# Patient Record
Sex: MALE | Race: WHITE | NOT HISPANIC OR LATINO | Employment: OTHER | URBAN - METROPOLITAN AREA
[De-identification: names, ages, dates, MRNs, and addresses within clinical notes are randomized per-mention and may not be internally consistent; named-entity substitution may affect disease eponyms.]

---

## 2019-12-10 ENCOUNTER — TRANSCRIBE ORDERS (OUTPATIENT)
Dept: ADMINISTRATIVE | Facility: HOSPITAL | Age: 49
End: 2019-12-10

## 2019-12-10 DIAGNOSIS — N20.0 KIDNEY STONE: Primary | ICD-10-CM

## 2019-12-24 ENCOUNTER — HOSPITAL ENCOUNTER (OUTPATIENT)
Dept: CT IMAGING | Facility: HOSPITAL | Age: 49
Discharge: HOME | End: 2019-12-24

## 2019-12-24 DIAGNOSIS — N20.0 KIDNEY STONE: ICD-10-CM

## 2020-01-07 ENCOUNTER — HOSPITAL ENCOUNTER (OUTPATIENT)
Dept: CT IMAGING | Facility: HOSPITAL | Age: 50
Discharge: HOME | End: 2020-01-07
Payer: COMMERCIAL

## 2020-01-07 DIAGNOSIS — N20.0 KIDNEY STONE: ICD-10-CM

## 2020-01-07 PROCEDURE — 74176 CT ABD & PELVIS W/O CONTRAST: CPT

## 2020-01-16 ENCOUNTER — CONSULT (OUTPATIENT)
Dept: UROLOGY | Facility: CLINIC | Age: 50
End: 2020-01-16
Payer: COMMERCIAL

## 2020-01-16 ENCOUNTER — PREP FOR PROCEDURE (OUTPATIENT)
Dept: UROLOGY | Facility: CLINIC | Age: 50
End: 2020-01-16

## 2020-01-16 VITALS
HEIGHT: 68 IN | HEART RATE: 95 BPM | SYSTOLIC BLOOD PRESSURE: 120 MMHG | WEIGHT: 154.2 LBS | DIASTOLIC BLOOD PRESSURE: 72 MMHG | BODY MASS INDEX: 23.37 KG/M2

## 2020-01-16 DIAGNOSIS — N20.0 KIDNEY STONES: Primary | ICD-10-CM

## 2020-01-16 DIAGNOSIS — N20.0 CALCULUS OF KIDNEY: Primary | ICD-10-CM

## 2020-01-16 DIAGNOSIS — Z01.818 PRE-OP TESTING: ICD-10-CM

## 2020-01-16 PROCEDURE — 99203 OFFICE O/P NEW LOW 30 MIN: CPT | Performed by: PHYSICIAN ASSISTANT

## 2020-01-16 PROCEDURE — 82360 CALCULUS ASSAY QUANT: CPT | Performed by: PHYSICIAN ASSISTANT

## 2020-01-16 RX ORDER — DULOXETIN HYDROCHLORIDE 20 MG/1
40 CAPSULE, DELAYED RELEASE ORAL 3 TIMES DAILY
COMMUNITY

## 2020-01-16 RX ORDER — UBIDECARENONE 75 MG
CAPSULE ORAL DAILY
COMMUNITY
End: 2020-01-22

## 2020-01-16 RX ORDER — TAMSULOSIN HYDROCHLORIDE 0.4 MG/1
CAPSULE ORAL
COMMUNITY

## 2020-01-16 RX ORDER — SERTRALINE HYDROCHLORIDE 100 MG/1
TABLET, FILM COATED ORAL
COMMUNITY

## 2020-01-16 RX ORDER — SODIUM CHLORIDE, SODIUM LACTATE, POTASSIUM CHLORIDE, CALCIUM CHLORIDE 600; 310; 30; 20 MG/100ML; MG/100ML; MG/100ML; MG/100ML
125 INJECTION, SOLUTION INTRAVENOUS CONTINUOUS
Status: CANCELLED | OUTPATIENT
Start: 2020-01-16

## 2020-01-16 RX ORDER — GABAPENTIN 250 MG/5ML
SOLUTION ORAL
COMMUNITY

## 2020-01-16 RX ORDER — BACLOFEN 10 MG/1
10 TABLET ORAL 3 TIMES DAILY PRN
COMMUNITY

## 2020-01-16 NOTE — PROGRESS NOTES
HISTORY AND PHYSICAL       Patient Identifiers: Chuy Shannon (MRN: [id number])    Urology, Mansoor Salcedo PA-C  Date of Service: 1/16/2020    History of Present Illness:     Chuy Shannon is a 54 y o  Male referred from the [place] for evaluation of kidney stones  He previously saw a urologist in [place]  He had a CT scan by his family doctor showing bilateral stones 7 mm in the left kidney and 11 mm in the right kidney  He brings in multiple stones which he has passed over relatively short period of time mostly from his left side  He is unable to give a urine sample  He has multiple sclerosis walks with a cane and is fairly shaky  Past Medical, Past Surgical History:     Past Medical History:   Diagnosis Date    Kidney stone    :    Past Surgical History:   Procedure Laterality Date    LITHOTRIPSY     :    Medications, Allergies:     Current Outpatient Medications:     baclofen 10 mg tablet, Take 10 mg by mouth Three times daily as needed, Disp: , Rfl:     Cholecalciferol 1 25 MG (04329 UT) capsule, 1,000 Units , Disp: , Rfl:     cyanocobalamin (VITAMIN B-12) 100 mcg tablet, Take by mouth daily, Disp: , Rfl:     Diclofenac Epolamine 1 3 % PTCH, APPLY 2GM TO UPPER EXTREMITIES TOPICALLY FOUR TIMES A DAY AS NEEDED **USE DOSING CARD** (DO NOT EXCEED 16 GRAMS DAILY TO ANY AFFECTED JOINT OF THE LOWER EXTREMITIES  DO NOT EXCEED 8 GRAMS DAILY TO ANY AFFECTED JOINT OF THE UPPER EXTREMITIES   DO NOT EXCEED A TOTAL DOSE OF 32 GRAMS DAILY OVER ALL JOINTS)  FOR NECK AND SHOULDER PAIN, Disp: , Rfl:     DULoxetine (CYMBALTA) 20 mg capsule, Take 40 mg by mouth, Disp: , Rfl:     Gabapentin 300 MG/6ML SOLN, TAKE TWO CAPSULES BY MOUTH EVERY MORNING AND TAKE ONE CAPSULE NOON AND TAKE TWO CAPSULES EVENING FOR NEUROPATHIC PAIN OR SEIZURES, Disp: , Rfl:     hydrocortisone 2 5 % ointment, INSERT THIN LAYER INTO RECTUM THREE TIMES A DAY AS NEEDED FOR HEMORRHOIDAL PAIN AND SWELLING, Disp: , Rfl:     sertraline (ZOLOFT) 100 mg tablet, TAKE ONE AND ONE-HALF TABLETS BY MOUTH EVERY DAY FOR MOOD, Disp: , Rfl:     tamsulosin (FLOMAX) 0 4 mg, TAKE ONE CAPSULE BY MOUTH DAILY FOR PROSTATE/NIGHTTIME URINATION (TAKE 30 MINUTES AFTER THE SAME MEAL DAILY), Disp: , Rfl:     Allergies: Allergies   Allergen Reactions    Aspirin      Other reaction(s): Swelling of structure of eye (finding)    Teriflunomide Abdominal Pain     Other reaction(s): Abdominal pain (finding)     :    Social and Family History:   Social History:   Social History     Tobacco Use    Smoking status: Current Every Day Smoker    Smokeless tobacco: Never Used   Substance Use Topics    Alcohol use: Not Currently    Drug use: Never     Social History     Tobacco Use   Smoking Status Current Every Day Smoker   Smokeless Tobacco Never Used       Family History:  History reviewed  No pertinent family history :     Review of Systems:     General: Fever, chills, or night sweats: negative  Cardiac: Negative for chest pain  Pulmonary: Negative for shortness of breath  Gastrointestinal: Abdominal pain negative  Nausea, vomiting, or diarrhea negative,  Genitourinary: See HPI above  Patient does not have hematuria  All other systems queried were negative  Physical Exam:   General: Patient is pleasant and in NAD  Awake and alert  /72   Pulse 95   Ht 5' 8" (1 727 m)   Wt 69 9 kg (154 lb 3 2 oz)   BMI 23 45 kg/m²   Cardiac: regular rate  Peripheral edema: negative  Pulmonary: Non-labored breathing lungs clear bilaterally  Abdomen: Soft, non-tender, non-distended  No surgical scars  No masses, tenderness, hernias noted  Genitourinary: Negative CVA tenderness, negative suprapubic tenderness        Labs:   No results found for: HGB, HCT, WBC, PLT]    No results found for: NA, K, CL, CO2, BUN, CREATININE, CALCIUM, GLUCOSE]    Imaging:   I personally reviewed the images and report of the following studies, and reviewed them with the patient:  CT ABDOMEN AND PELVIS WITHOUT IV CONTRAST - LOW DOSE RENAL STONE      IMPRESSION:     Bilateral nonobstructive nephrolithiasis  No hydronephrosis  Numerous bladder calculi      ASSESSMENT:       1  Nephrolithiasis   2   Multiple sclerosis    PLAN:   - I reviewed the options of management the patient  - he would like to proceed with ureteroscopy and laser lithotripsy as soon as possible to the left side which has been his most bothersome  - I did point out of the right sided stone burden is  Larger however he would like to proceed on the left side 1st  - case request was entered for Dr Fahad Coronado PA-C

## 2020-01-21 ENCOUNTER — TELEPHONE (OUTPATIENT)
Dept: UROLOGY | Facility: CLINIC | Age: 50
End: 2020-01-21

## 2020-01-21 PROBLEM — N20.0 KIDNEY STONES: Status: ACTIVE | Noted: 2020-01-21

## 2020-01-21 NOTE — TELEPHONE ENCOUNTER
I spoke with pt this afternoon and scheduled him for a procedure with Dr Evia Halsted on 2/11/20 at the [facility]  I verbally went over all of pt 's pre-op instruction and prep information with pt  He will have a urine culture done at a [facility] facility 1-2 weeks prior to this procedure  Per pt 's request I mailed him a copy of his surgical packet and instructed him to call me with any questions or concerns regarding this procedure

## 2020-01-22 NOTE — PRE-PROCEDURE INSTRUCTIONS
Pre-Surgery Instructions:   Medication Instructions    baclofen 10 mg tablet Instructed patient per Anesthesia Guidelines   Cholecalciferol 1 25 MG (09884 UT) capsule Instructed patient per Anesthesia Guidelines   Diclofenac Epolamine 1 3 % PTCH Instructed patient per Anesthesia Guidelines   DULoxetine (CYMBALTA) 20 mg capsule Instructed patient per Anesthesia Guidelines   Gabapentin 300 MG/6ML SOLN Instructed patient per Anesthesia Guidelines   hydrocortisone 2 5 % ointment Instructed patient per Anesthesia Guidelines   sertraline (ZOLOFT) 100 mg tablet Instructed patient per Anesthesia Guidelines   tamsulosin (FLOMAX) 0 4 mg Instructed patient per Anesthesia Guidelines      Pre-op medication, and showering instructions with antibacteral soap reviewed

## 2020-02-03 ENCOUNTER — ANESTHESIA EVENT (OUTPATIENT)
Dept: PERIOP | Facility: AMBULARY SURGERY CENTER | Age: 50
End: 2020-02-03
Payer: OTHER GOVERNMENT

## 2020-02-05 LAB
CA PHOS MFR STONE: 94 %
CALCIUM OXALATE DIHYDRATE MFR STONE IR: 2 %
COLOR STONE: NORMAL
COM MFR STONE: 4 %
COMMENT-STONE3: NORMAL
COMPOSITION: NORMAL
LABORATORY COMMENT REPORT: NORMAL
NIDUS STONE QL: NORMAL
PHOTO: NORMAL
SIZE STONE: NORMAL MM
STONE ANALYSIS-IMP: NORMAL
WT STONE: 110 MG

## 2020-02-06 ENCOUNTER — APPOINTMENT (OUTPATIENT)
Dept: LAB | Facility: CLINIC | Age: 50
End: 2020-02-06
Payer: COMMERCIAL

## 2020-02-06 ENCOUNTER — TRANSCRIBE ORDERS (OUTPATIENT)
Dept: LAB | Facility: CLINIC | Age: 50
End: 2020-02-06

## 2020-02-06 DIAGNOSIS — N20.0 KIDNEY STONES: ICD-10-CM

## 2020-02-06 DIAGNOSIS — Z01.818 PRE-OP TESTING: ICD-10-CM

## 2020-02-06 PROCEDURE — 87086 URINE CULTURE/COLONY COUNT: CPT

## 2020-02-07 LAB — BACTERIA UR CULT: NORMAL

## 2020-02-07 PROCEDURE — NC001 PR NO CHARGE: Performed by: UROLOGY

## 2020-02-07 NOTE — H&P
HISTORY AND PHYSICAL EXAM      Basil Montiel is a 54 y o  Male referred from the [place] for evaluation of kidney stones  He previously saw a urologist in [place]  He had a CT scan by his family doctor showing bilateral stones 7 mm in the left kidney and 11 mm in the right kidney  He brings in multiple stones which he has passed over relatively short period of time mostly from his left side     He has multiple sclerosis walks with a cane and is fairly shaky      Past Medical, Past Surgical History:      Medical History        Past Medical History:   Diagnosis Date    Kidney stone        :  Multiple sclerosis     Surgical History         Past Surgical History:   Procedure Laterality Date    LITHOTRIPSY          :     Medications, Allergies:      Current Outpatient Medications:     baclofen 10 mg tablet, Take 10 mg by mouth Three times daily as needed, Disp: , Rfl:     Cholecalciferol 1 25 MG (49261 UT) capsule, 1,000 Units , Disp: , Rfl:     cyanocobalamin (VITAMIN B-12) 100 mcg tablet, Take by mouth daily, Disp: , Rfl:     Diclofenac Epolamine 1 3 % PTCH, APPLY 2GM TO UPPER EXTREMITIES TOPICALLY FOUR TIMES A DAY AS NEEDED **USE DOSING CARD** (DO NOT EXCEED 16 GRAMS DAILY TO ANY AFFECTED JOINT OF THE LOWER EXTREMITIES  DO NOT EXCEED 8 GRAMS DAILY TO ANY AFFECTED JOINT OF THE UPPER EXTREMITIES   DO NOT EXCEED A TOTAL DOSE OF 32 GRAMS DAILY OVER ALL JOINTS)  FOR NECK AND SHOULDER PAIN, Disp: , Rfl:     DULoxetine (CYMBALTA) 20 mg capsule, Take 40 mg by mouth, Disp: , Rfl:     Gabapentin 300 MG/6ML SOLN, TAKE TWO CAPSULES BY MOUTH EVERY MORNING AND TAKE ONE CAPSULE NOON AND TAKE TWO CAPSULES EVENING FOR NEUROPATHIC PAIN OR SEIZURES, Disp: , Rfl:     hydrocortisone 2 5 % ointment, INSERT THIN LAYER INTO RECTUM THREE TIMES A DAY AS NEEDED FOR HEMORRHOIDAL PAIN AND SWELLING, Disp: , Rfl:     sertraline (ZOLOFT) 100 mg tablet, TAKE ONE AND ONE-HALF TABLETS BY MOUTH EVERY DAY FOR MOOD, Disp: , Rfl:     tamsulosin (FLOMAX) 0 4 mg, TAKE ONE CAPSULE BY MOUTH DAILY FOR PROSTATE/NIGHTTIME URINATION (TAKE 30 MINUTES AFTER THE SAME MEAL DAILY), Disp: , Rfl:      Allergies: Allergies   Allergen Reactions    Aspirin         Other reaction(s): Swelling of structure of eye (finding)    Teriflunomide Abdominal Pain       Other reaction(s): Abdominal pain (finding)      :     Social and Family History:   Social History:   Social History           Tobacco Use    Smoking status: Current Every Day Smoker    Smokeless tobacco: Never Used   Substance Use Topics    Alcohol use: Not Currently    Drug use: Never        Social History          Tobacco Use   Smoking Status Current Every Day Smoker   Smokeless Tobacco Never Used         Family History:  History reviewed  No pertinent family history  :      Review of Systems:      General: Fever, chills, or night sweats: negative  Cardiac: Negative for chest pain     Pulmonary: Negative for shortness of breath  Gastrointestinal: Abdominal pain negative  Nausea, vomiting, or diarrhea negative,  Genitourinary: See HPI above   Patient does not have hematuria  All other systems queried were negative      Physical Exam:   General: Patient is pleasant and in NAD  Awake and alert  /72   Pulse 95   Ht 5' 8" (1 727 m)   Wt 69 9 kg (154 lb 3 2 oz)   BMI 23 45 kg/m²   Cardiac: regular rate, no murmurs  Peripheral edema: negative  Pulmonary: Non-labored breathing lungs clear bilaterally  Abdomen: Soft, non-tender, non-distended   No surgical scars   No masses, tenderness, hernias noted     Genitourinary: Negative CVA tenderness, negative suprapubic tenderness    Neuro:  Intact no motor sensory deficits        Labs:   No results found for: HGB, HCT, WBC, PLT]     No results found for: NA, K, CL, CO2, BUN, CREATININE, CALCIUM, GLUCOSE]     Imaging:   I personally reviewed the images and report of the following studies, and reviewed them with the patient:  CT ABDOMEN AND PELVIS WITHOUT IV CONTRAST - LOW DOSE RENAL STONE      IMPRESSION:     Bilateral nonobstructive nephrolithiasis   No hydronephrosis      Numerous bladder calculi        ASSESSMENT:       1  Nephrolithiasis   2   Multiple sclerosis     PLAN:   - I reviewed the options of management the patient  - he would like to proceed with LEFT ureteroscopy and laser lithotripsy as soon as possible to the left side which has been his most bothersome

## 2020-02-11 ENCOUNTER — ANESTHESIA (OUTPATIENT)
Dept: PERIOP | Facility: AMBULARY SURGERY CENTER | Age: 50
End: 2020-02-11
Payer: OTHER GOVERNMENT

## 2020-02-11 ENCOUNTER — APPOINTMENT (OUTPATIENT)
Dept: RADIOLOGY | Facility: AMBULARY SURGERY CENTER | Age: 50
End: 2020-02-11
Payer: OTHER GOVERNMENT

## 2020-02-11 ENCOUNTER — HOSPITAL ENCOUNTER (OUTPATIENT)
Facility: AMBULARY SURGERY CENTER | Age: 50
Setting detail: OUTPATIENT SURGERY
Discharge: HOME | End: 2020-02-11
Attending: UROLOGY | Admitting: UROLOGY
Payer: OTHER GOVERNMENT

## 2020-02-11 VITALS
HEART RATE: 61 BPM | HEIGHT: 68 IN | DIASTOLIC BLOOD PRESSURE: 84 MMHG | RESPIRATION RATE: 18 BRPM | TEMPERATURE: 97.6 F | WEIGHT: 154 LBS | OXYGEN SATURATION: 99 % | SYSTOLIC BLOOD PRESSURE: 133 MMHG | BODY MASS INDEX: 23.34 KG/M2

## 2020-02-11 DIAGNOSIS — N39.0 URINARY TRACT INFECTION WITHOUT HEMATURIA, SITE UNSPECIFIED: Primary | ICD-10-CM

## 2020-02-11 DIAGNOSIS — N39.0 URINARY TRACT INFECTION WITHOUT HEMATURIA, SITE UNSPECIFIED: ICD-10-CM

## 2020-02-11 DIAGNOSIS — N20.0 KIDNEY STONES: ICD-10-CM

## 2020-02-11 PROCEDURE — C1758 CATHETER, URETERAL: HCPCS | Performed by: UROLOGY

## 2020-02-11 PROCEDURE — 88300 SURGICAL PATH GROSS: CPT | Performed by: PATHOLOGY

## 2020-02-11 PROCEDURE — C2617 STENT, NON-COR, TEM W/O DEL: HCPCS | Performed by: UROLOGY

## 2020-02-11 PROCEDURE — 52356 CYSTO/URETERO W/LITHOTRIPSY: CPT | Performed by: UROLOGY

## 2020-02-11 PROCEDURE — C1769 GUIDE WIRE: HCPCS | Performed by: UROLOGY

## 2020-02-11 PROCEDURE — 74420 UROGRAPHY RTRGR +-KUB: CPT

## 2020-02-11 PROCEDURE — 82360 CALCULUS ASSAY QUANT: CPT | Performed by: UROLOGY

## 2020-02-11 DEVICE — STENT URETERAL 4.7FR 26CM INLAY OPTIMA: Type: IMPLANTABLE DEVICE | Site: URETER | Status: FUNCTIONAL

## 2020-02-11 RX ORDER — MIDAZOLAM HYDROCHLORIDE 2 MG/2ML
INJECTION, SOLUTION INTRAMUSCULAR; INTRAVENOUS AS NEEDED
Status: DISCONTINUED | OUTPATIENT
Start: 2020-02-11 | End: 2020-02-11 | Stop reason: SURG

## 2020-02-11 RX ORDER — ONDANSETRON 2 MG/ML
4 INJECTION INTRAMUSCULAR; INTRAVENOUS EVERY 8 HOURS PRN
Status: CANCELLED | OUTPATIENT
Start: 2020-02-11

## 2020-02-11 RX ORDER — ONDANSETRON 2 MG/ML
INJECTION INTRAMUSCULAR; INTRAVENOUS AS NEEDED
Status: DISCONTINUED | OUTPATIENT
Start: 2020-02-11 | End: 2020-02-11 | Stop reason: SURG

## 2020-02-11 RX ORDER — MAGNESIUM HYDROXIDE 1200 MG/15ML
LIQUID ORAL AS NEEDED
Status: DISCONTINUED | OUTPATIENT
Start: 2020-02-11 | End: 2020-02-11 | Stop reason: HOSPADM

## 2020-02-11 RX ORDER — FENTANYL CITRATE/PF 50 MCG/ML
25 SYRINGE (ML) INJECTION
Status: DISCONTINUED | OUTPATIENT
Start: 2020-02-11 | End: 2020-02-11 | Stop reason: HOSPADM

## 2020-02-11 RX ORDER — ACETAMINOPHEN 325 MG/1
650 TABLET ORAL EVERY 6 HOURS PRN
Status: CANCELLED | OUTPATIENT
Start: 2020-02-11

## 2020-02-11 RX ORDER — NEOSTIGMINE METHYLSULFATE 1 MG/ML
INJECTION INTRAVENOUS AS NEEDED
Status: DISCONTINUED | OUTPATIENT
Start: 2020-02-11 | End: 2020-02-11 | Stop reason: SURG

## 2020-02-11 RX ORDER — CEPHALEXIN 500 MG/1
500 CAPSULE ORAL EVERY 24 HOURS
Qty: 5 CAPSULE | Refills: 0 | Status: SHIPPED | OUTPATIENT
Start: 2020-02-11 | End: 2020-02-16

## 2020-02-11 RX ORDER — GLYCOPYRROLATE 0.2 MG/ML
INJECTION INTRAMUSCULAR; INTRAVENOUS AS NEEDED
Status: DISCONTINUED | OUTPATIENT
Start: 2020-02-11 | End: 2020-02-11 | Stop reason: SURG

## 2020-02-11 RX ORDER — FENTANYL CITRATE 50 UG/ML
INJECTION, SOLUTION INTRAMUSCULAR; INTRAVENOUS AS NEEDED
Status: DISCONTINUED | OUTPATIENT
Start: 2020-02-11 | End: 2020-02-11 | Stop reason: SURG

## 2020-02-11 RX ORDER — PROPOFOL 10 MG/ML
INJECTION, EMULSION INTRAVENOUS AS NEEDED
Status: DISCONTINUED | OUTPATIENT
Start: 2020-02-11 | End: 2020-02-11 | Stop reason: SURG

## 2020-02-11 RX ORDER — CEFAZOLIN SODIUM 2 G/50ML
2000 SOLUTION INTRAVENOUS ONCE
Status: DISCONTINUED | OUTPATIENT
Start: 2020-02-11 | End: 2020-02-11

## 2020-02-11 RX ORDER — CIPROFLOXACIN 250 MG/1
250 TABLET, FILM COATED ORAL EVERY 12 HOURS SCHEDULED
Qty: 10 TABLET | Refills: 0 | Status: SHIPPED | OUTPATIENT
Start: 2020-02-11 | End: 2020-02-16

## 2020-02-11 RX ORDER — SODIUM CHLORIDE 9 MG/ML
125 INJECTION, SOLUTION INTRAVENOUS CONTINUOUS
Status: DISCONTINUED | OUTPATIENT
Start: 2020-02-11 | End: 2020-02-11

## 2020-02-11 RX ORDER — OXYCODONE HYDROCHLORIDE AND ACETAMINOPHEN 5; 325 MG/1; MG/1
1-2 TABLET ORAL EVERY 6 HOURS PRN
Qty: 10 TABLET | Refills: 0 | Status: SHIPPED | OUTPATIENT
Start: 2020-02-11 | End: 2020-02-21

## 2020-02-11 RX ORDER — OXYCODONE HYDROCHLORIDE AND ACETAMINOPHEN 5; 325 MG/1; MG/1
1 TABLET ORAL EVERY 4 HOURS PRN
Status: CANCELLED | OUTPATIENT
Start: 2020-02-11

## 2020-02-11 RX ORDER — HYDROMORPHONE HCL/PF 1 MG/ML
0.2 SYRINGE (ML) INJECTION
Status: DISCONTINUED | OUTPATIENT
Start: 2020-02-11 | End: 2020-02-11 | Stop reason: HOSPADM

## 2020-02-11 RX ORDER — ROCURONIUM BROMIDE 10 MG/ML
INJECTION, SOLUTION INTRAVENOUS AS NEEDED
Status: DISCONTINUED | OUTPATIENT
Start: 2020-02-11 | End: 2020-02-11 | Stop reason: SURG

## 2020-02-11 RX ORDER — ONDANSETRON 2 MG/ML
4 INJECTION INTRAMUSCULAR; INTRAVENOUS ONCE AS NEEDED
Status: DISCONTINUED | OUTPATIENT
Start: 2020-02-11 | End: 2020-02-11 | Stop reason: HOSPADM

## 2020-02-11 RX ORDER — SODIUM CHLORIDE, SODIUM LACTATE, POTASSIUM CHLORIDE, CALCIUM CHLORIDE 600; 310; 30; 20 MG/100ML; MG/100ML; MG/100ML; MG/100ML
125 INJECTION, SOLUTION INTRAVENOUS CONTINUOUS
Status: DISCONTINUED | OUTPATIENT
Start: 2020-02-11 | End: 2020-02-11 | Stop reason: HOSPADM

## 2020-02-11 RX ORDER — LIDOCAINE HYDROCHLORIDE 10 MG/ML
INJECTION, SOLUTION EPIDURAL; INFILTRATION; INTRACAUDAL; PERINEURAL AS NEEDED
Status: DISCONTINUED | OUTPATIENT
Start: 2020-02-11 | End: 2020-02-11 | Stop reason: SURG

## 2020-02-11 RX ORDER — CIPROFLOXACIN 250 MG/1
250 TABLET, FILM COATED ORAL EVERY 12 HOURS SCHEDULED
Qty: 10 TABLET | Refills: 0 | Status: SHIPPED | OUTPATIENT
Start: 2020-02-11 | End: 2020-02-11

## 2020-02-11 RX ORDER — CEFAZOLIN SODIUM 2 G/50ML
2000 SOLUTION INTRAVENOUS
Status: COMPLETED | OUTPATIENT
Start: 2020-02-11 | End: 2020-02-11

## 2020-02-11 RX ORDER — SODIUM CHLORIDE, SODIUM LACTATE, POTASSIUM CHLORIDE, CALCIUM CHLORIDE 600; 310; 30; 20 MG/100ML; MG/100ML; MG/100ML; MG/100ML
125 INJECTION, SOLUTION INTRAVENOUS CONTINUOUS
Status: DISCONTINUED | OUTPATIENT
Start: 2020-02-11 | End: 2020-02-11

## 2020-02-11 RX ORDER — CIPROFLOXACIN 250 MG/1
250 TABLET, FILM COATED ORAL EVERY 12 HOURS SCHEDULED
Qty: 10 TABLET | Refills: 0 | OUTPATIENT
Start: 2020-02-11 | End: 2020-02-16

## 2020-02-11 RX ORDER — LIDOCAINE HYDROCHLORIDE 10 MG/ML
0.5 INJECTION, SOLUTION EPIDURAL; INFILTRATION; INTRACAUDAL; PERINEURAL ONCE AS NEEDED
Status: DISCONTINUED | OUTPATIENT
Start: 2020-02-11 | End: 2020-02-11 | Stop reason: HOSPADM

## 2020-02-11 RX ADMIN — SODIUM CHLORIDE, SODIUM LACTATE, POTASSIUM CHLORIDE, AND CALCIUM CHLORIDE: .6; .31; .03; .02 INJECTION, SOLUTION INTRAVENOUS at 08:04

## 2020-02-11 RX ADMIN — PROPOFOL 50 MG: 10 INJECTION, EMULSION INTRAVENOUS at 08:39

## 2020-02-11 RX ADMIN — SODIUM CHLORIDE, SODIUM LACTATE, POTASSIUM CHLORIDE, AND CALCIUM CHLORIDE: .6; .31; .03; .02 INJECTION, SOLUTION INTRAVENOUS at 07:00

## 2020-02-11 RX ADMIN — CEFAZOLIN SODIUM 1000 MG: 2 SOLUTION INTRAVENOUS at 07:35

## 2020-02-11 RX ADMIN — ONDANSETRON 4 MG: 2 INJECTION INTRAMUSCULAR; INTRAVENOUS at 08:04

## 2020-02-11 RX ADMIN — PROPOFOL 50 MG: 10 INJECTION, EMULSION INTRAVENOUS at 07:51

## 2020-02-11 RX ADMIN — MIDAZOLAM HYDROCHLORIDE 1 MG: 1 INJECTION, SOLUTION INTRAMUSCULAR; INTRAVENOUS at 07:35

## 2020-02-11 RX ADMIN — LIDOCAINE HYDROCHLORIDE 50 MG: 10 INJECTION, SOLUTION EPIDURAL; INFILTRATION; INTRACAUDAL; PERINEURAL at 07:49

## 2020-02-11 RX ADMIN — NEOSTIGMINE METHYLSULFATE 1 MG: 1 INJECTION INTRAVENOUS at 08:28

## 2020-02-11 RX ADMIN — ROCURONIUM BROMIDE 15 MG: 10 SOLUTION INTRAVENOUS at 07:53

## 2020-02-11 RX ADMIN — GLYCOPYRROLATE 0.2 MG: 0.2 INJECTION, SOLUTION INTRAMUSCULAR; INTRAVENOUS at 08:28

## 2020-02-11 RX ADMIN — PROPOFOL 200 MG: 10 INJECTION, EMULSION INTRAVENOUS at 07:49

## 2020-02-11 RX ADMIN — PROPOFOL 50 MG: 10 INJECTION, EMULSION INTRAVENOUS at 07:53

## 2020-02-11 RX ADMIN — FENTANYL CITRATE 50 MCG: 50 INJECTION, SOLUTION INTRAMUSCULAR; INTRAVENOUS at 07:49

## 2020-02-11 RX ADMIN — FENTANYL CITRATE 50 MCG: 50 INJECTION, SOLUTION INTRAMUSCULAR; INTRAVENOUS at 08:03

## 2020-02-11 NOTE — TELEPHONE ENCOUNTER
Olvin Monreal calls back  Now she is questioning Cipro vs  Keflex and the dosage  I consulted with Barbara Snider PA-C  Order for Cipro 250mg was DISCONTINUED and Keflex 500mg 1 PO QD#5 for 5 days was substituted  YES the order is correct  They will mail out accordingly

## 2020-02-11 NOTE — TELEPHONE ENCOUNTER
I called Sabina Moon at [facility] in [place]  The script was successfully received for Mr Lianne Rodriguez  She will process immediately  No further action required

## 2020-02-11 NOTE — TELEPHONE ENCOUNTER
This is a patient of Dr Mathew Montes in [facility] pharmacy called and said they received an order for cipro  They said it is missing the doctors signature  They would need a script faxed to [phone]  If there are any questions Marletta Card can be called at [phone] ext [phone]

## 2020-02-11 NOTE — OP NOTE
OPERATIVE REPORT  PATIENT NAME: Lore Aragon    :  1970  MRN: [id number]  Pt Location: AN  OR ROOM 04    SURGERY DATE: 2020    Surgeon(s) and Role:     * Carlotta Fuller MD - Primary    Preop Diagnosis:  Kidney stones [N20 0]    Post-Op Diagnosis Codes:     * Kidney stones [N20 0]    Procedure(s) (LRB):  CYSTOSCOPY URETEROSCOPY  WITH LITHOTRIPSY HOLMIUM LASER, BASKET STONE REMOVAL, RETROGRADE PYELOGRAM AND INSERTION STENT URETERAL (Left)    Specimen(s):  ID Type Source Tests Collected by Time Destination   1 :  Calculus Kidney, Left STONE ANALYSIS, TISSUE EXAM Carlotta Fuller MD 2020 [id number]        Estimated Blood Loss:   Minimal    Drains:  Ureteral Drain/Stent Left ureter 4 7 Fr  (Active)   Number of days: 0       Anesthesia Type:   General    Operative Indications:  Kidney stones [V62 0]    Complications:   None    Procedure and Technique:  PLAN FOR STENT:  Left attached to a string removal in 3 days in the office  The patient was brought into the OR, properly identified and positioned on the table  General anesthesia was induced, and he was prepped using betadine solution and draped in lithotomy position  The 22F scope was introduced in the urethra, which showed no strictures  The prostate had minimal hyperplasia  The bladder was inspected and had no lesions, stones, or tumors  The left ureteral orifice was identified and a retrograde catheter was positioned at the orifice  A retrograde pyelogram demonstrated no evidence of obstruction possibly a filling defect in the middle pole of the kidney consistent with the stone  A hydrophilic tip guide wire placed up the ureter  A double-lumen was used to pass a 2nd wire, over which a flexible ureteroscope was easily passed into the renal pelvis  The Holmium laser fiber was introduced thru the flexible ureteroscope and advanced to the edge of stone  Using various laser settings, stone was lasered into numerous small particles   Care was taken not to laser tissue  Large particles were basketed  A basket was placed thru the scope and carefully engaged the stone right  The scope was withdrawn, bringing the stone out, with no trauma to the ureter  All remaining particles were judged small enough to pass around the stent  The flexible ureteroscope was removed, and the cystoscope was used to place a 4 8 F stent in the ureter, with the upper coils in the renal pelvis, and the distal coil in the bladder  The bladder was emptied and the cystoscope was removed  The stent was left attached to a string, which was subsequently tapered the patient's penis  The patient was awaken from anesthesia and taken to the PACU in good condition       Patient Disposition:  PACU     SIGNATURE: Nedra Patel MD  DATE: February 11, 2020  TIME: 8:34 AM

## 2020-02-11 NOTE — TELEPHONE ENCOUNTER
Eugenio Stockton from [place] calling to advise the pharmacy listed on the Order is [facility], not [place]  She is asking that this be faxed over promptly as it needs to be overnighted and the cutoff is nearing      It can be faxed to [phone]

## 2020-02-11 NOTE — ANESTHESIA PREPROCEDURE EVALUATION
Review of Systems/Medical History  Patient summary reviewed    No history of anesthetic complications     Cardiovascular  Exercise tolerance (METS): <4, Exercise comment: Walks with cane No angina ,    Pulmonary  Smoker , No shortness of breath, No recent URI ,        GI/Hepatic    No GERD ,        Kidney stones,        Endo/Other  Negative endo/other ROS      GYN       Hematology   Musculoskeletal       Neurology      Comment: Multiple sclerosis- weakness arms R >L, numbness in right hand, unsteady gait, currently not on medications Psychology           Physical Exam    Airway    Mallampati score: I  TM Distance: >3 FB  Neck ROM: full     Dental   No notable dental hx     Cardiovascular      Pulmonary      Other Findings        Anesthesia Plan  ASA Score- 2     Anesthesia Type- general with ASA Monitors  Additional Monitors:   Airway Plan: LMA  Plan Factors-    Induction- intravenous  Postoperative Plan-     Informed Consent- Anesthetic plan and risks discussed with patient  I personally reviewed this patient with the CRNA  Discussed and agreed on the Anesthesia Plan with the CRNA  Marquis Myles

## 2020-02-11 NOTE — DISCHARGE INSTRUCTIONS

## 2020-02-11 NOTE — INTERVAL H&P NOTE
H&P reviewed  After examining the patient I find no changes in the patients condition since the H&P had been written      Vitals:    02/11/20 0658   BP: 141/76   Pulse: 69   Resp: 18   Temp: 98 4 °F (36 9 °C)   SpO2: 99%

## 2020-02-11 NOTE — ANESTHESIA POSTPROCEDURE EVALUATION
Post-Op Assessment Note    CV Status:  Stable    Pain management: adequate     Mental Status:  Arousable   Hydration Status:  Stable   PONV Controlled:  Controlled   Airway Patency:  Patent   Post Op Vitals Reviewed: Yes      Staff: CRNA           BP 95/50 (02/11/20 0845)    Temp 97 7 °F (36 5 °C) (02/11/20 0844)    Pulse 64 (02/11/20 0845)   Resp 16 (02/11/20 0845)    SpO2 97 % (02/11/20 0845)

## 2020-02-11 NOTE — TELEPHONE ENCOUNTER
Isis Mohamud from [facility] [phone] x [phone] is calling to say there is a drug interaction with Cipro and current medication Duloxetine  If different antibiotic is prescribed it would need to be faxed to [phone]

## 2020-02-12 ENCOUNTER — TELEPHONE (OUTPATIENT)
Dept: UROLOGY | Facility: MEDICAL CENTER | Age: 50
End: 2020-02-12

## 2020-02-12 NOTE — TELEPHONE ENCOUNTER
Patient scheduled for stent with string removal with RN on 2/14/20 at [facility] in the [place] office  Please call and confirm with patient

## 2020-02-12 NOTE — TELEPHONE ENCOUNTER
This is a patient of Dr Oksana Shahid and had surgery yesterday  He wants to schedule an appointment to have his stent removed  He would like to go to [place] office  Please call patient back at [phone]

## 2020-02-14 ENCOUNTER — PROCEDURE VISIT (OUTPATIENT)
Dept: UROLOGY | Facility: CLINIC | Age: 50
End: 2020-02-14
Payer: COMMERCIAL

## 2020-02-14 VITALS
HEIGHT: 68 IN | RESPIRATION RATE: 18 BRPM | HEART RATE: 80 BPM | WEIGHT: 154 LBS | DIASTOLIC BLOOD PRESSURE: 86 MMHG | BODY MASS INDEX: 23.34 KG/M2 | SYSTOLIC BLOOD PRESSURE: 138 MMHG

## 2020-02-14 DIAGNOSIS — N20.0 KIDNEY STONES: Primary | ICD-10-CM

## 2020-02-14 PROCEDURE — 99211 OFF/OP EST MAY X REQ PHY/QHP: CPT

## 2020-02-14 NOTE — TELEPHONE ENCOUNTER
Pt confirmed with the phone center  Notes: stent with string removal   Made On:  Confirmed: 2/12/2020 3:47 PM  2/13/2020 8:46 AM By:  By: Nelson Riggins [address], [address] [facility] ()  [address], [address] [place] [address] ()

## 2020-02-14 NOTE — PROGRESS NOTES
2/14/2020  Leatha Salomon is a 54 y o  male  [id number]        Diagnosis  Chief Complaint     Nephrolithiasis          Patient is s/p left ureteroscopy with stone extraction on 2/11/20 with Dr Antonieta Pineda  Patient will follow up as scheduled in 2 months with renal US and KUB prior to visit  Procedure Stent with String Removal    Vitals:    02/14/20 0857   BP: 138/86   BP Location: Right arm   Patient Position: Sitting   Cuff Size: Standard   Pulse: 80   Resp: 18   Weight: 69 9 kg (154 lb)   Height: 5' 8" (1 727 m)       Stent with string removed intact without difficulty  Reviewed post stent removal symptoms including flank pain, dysuria, and hematuria  Instructed patient to increase oral fluid intake  Encouraged the use of NSAIDS and other prescribed pain medication as needed for discomfort  Patient instructed to call the office or report to the ER for uncontrolled pain, fever, chills, nausea or vomiting         Alethea Nguyen RN BSN

## 2020-02-27 LAB
CALCIUM OXALATE DIHYDRATE MFR STONE IR: 100 %
COLOR STONE: NORMAL
COMMENT-STONE3: NORMAL
COMPOSITION: NORMAL
LABORATORY COMMENT REPORT: NORMAL
PHOTO: NORMAL
SIZE STONE: NORMAL MM
SPEC SOURCE SUBJ: NORMAL
STONE ANALYSIS-IMP: NORMAL
WT STONE: 2 MG

## 2020-06-16 ENCOUNTER — TELEPHONE (OUTPATIENT)
Dept: UROLOGY | Facility: CLINIC | Age: 50
End: 2020-06-16

## 2020-07-07 ENCOUNTER — HOSPITAL ENCOUNTER (OUTPATIENT)
Dept: ULTRASOUND IMAGING | Facility: HOSPITAL | Age: 50
Discharge: HOME | End: 2020-07-07
Attending: UROLOGY
Payer: COMMERCIAL

## 2020-07-07 ENCOUNTER — HOSPITAL ENCOUNTER (OUTPATIENT)
Dept: RADIOLOGY | Facility: HOSPITAL | Age: 50
Discharge: HOME | End: 2020-07-07
Attending: UROLOGY
Payer: COMMERCIAL

## 2020-07-07 DIAGNOSIS — N20.0 KIDNEY STONES: ICD-10-CM

## 2020-07-07 PROCEDURE — 74018 RADEX ABDOMEN 1 VIEW: CPT

## 2020-07-07 PROCEDURE — 51798 US URINE CAPACITY MEASURE: CPT

## 2020-07-16 NOTE — TELEPHONE ENCOUNTER
Pt called and would like a call back today if possible about his kub results and to discuss his next steps for any future surgeries  Please advise and call pt   Thanks

## 2020-07-16 NOTE — TELEPHONE ENCOUNTER
IMPRESSION:  1  Bilateral nephrolithiasis without hydronephrosis  2  Simple left kidney lower pole 15 mm cyst  3   Post void bladder residual was 95 mL    If patient would like the discuss his nonobstructing stones, he should be scheduled for an office visit

## 2020-07-16 NOTE — TELEPHONE ENCOUNTER
Called and spoke to patient at this time, advised of the imaging results and advised if patient would like to discuss his non obstructing stones we can set him up with a appt with one of the providers  Patient would like to get scheduled, offered patient a appt on 7/24/2020 he states he has another appt this day offered him 7/27/2020 at 8:15am at the [facility]      Patient is agreeable to time and date and location and is thankful for call

## 2020-07-27 ENCOUNTER — OFFICE VISIT (OUTPATIENT)
Dept: UROLOGY | Facility: CLINIC | Age: 50
End: 2020-07-27
Payer: COMMERCIAL

## 2020-07-27 VITALS
DIASTOLIC BLOOD PRESSURE: 76 MMHG | WEIGHT: 145.4 LBS | SYSTOLIC BLOOD PRESSURE: 122 MMHG | TEMPERATURE: 97.1 F | HEART RATE: 62 BPM | BODY MASS INDEX: 22.04 KG/M2 | HEIGHT: 68 IN

## 2020-07-27 DIAGNOSIS — N20.0 KIDNEY STONES: Primary | ICD-10-CM

## 2020-07-27 PROCEDURE — 99213 OFFICE O/P EST LOW 20 MIN: CPT | Performed by: PHYSICIAN ASSISTANT

## 2020-07-27 NOTE — PROGRESS NOTES
There are no diagnoses linked to this encounter  Assessment and plan:       1  Nephrolithiasis, calcium oxalate  -status post left ureteroscopy 02/11/2020 with Dr Valentín Layton  -I reviewed with the patient his right lower pole stone measuring 11-12 mm  Reviewed options for this stone including but not limited to observation, ESWL, and ureteroscopy  Reviewed the risks and benefits of each of these options  Patient elects for observation  He will follow up in 1 year with a KUB prior to visit  He was counseled on hydration dietary modifications to minimize future stone formation  He is encouraged to contact us in the interim with any signs of infection or passing stone  All questions answered  Abbey Del Castillo PA-C      Chief Complaint     Nephrolithiasis follow-up    History of Present Illness     Patric Mariscal is a 52 y o  male patient with a history of nephrolithiasis status post left ureteroscopy (2/11/2020) presenting for follow-up  Patient had been seen in consultation in January 2020 in regards to bilateral intrarenal calculi  Patient underwent cystoscopy, left ureteroscopy, holmium laser, basket extraction, retrograde pyelogram, left ureteral stent insertion (02/11/2020) with Dr Valentín Layton  Patient's stone analysis was 100% calcium oxalate  Patient had a follow-up KUB and ultrasound (07/07/2020) that confirmed an 11-12 mm right intrarenal calculus  Ultrasound reports potential smaller 5 mm left mid-pole stone  There is no evidence of obstruction present  Patient's right renal stone correspond appropriately with previous CT from January 2020  Laboratory         Review of Systems     Review of Systems   Constitutional: Negative for activity change, appetite change, chills, diaphoresis, fatigue, fever and unexpected weight change  Respiratory: Negative for chest tightness and shortness of breath  Cardiovascular: Negative for chest pain, palpitations and leg swelling  Gastrointestinal: Negative for abdominal distention, abdominal pain, constipation, diarrhea, nausea and vomiting  Genitourinary: Negative for decreased urine volume, difficulty urinating, dysuria, enuresis, flank pain, frequency, genital sores, hematuria and urgency  Musculoskeletal: Negative for back pain, gait problem and myalgias  Skin: Negative for color change, pallor, rash and wound  Psychiatric/Behavioral: Negative for behavioral problems  The patient is not nervous/anxious  Allergies     Allergies   Allergen Reactions    Aspirin      Other reaction(s): Swelling of structure of eye (finding)    Teriflunomide Abdominal Pain     Other reaction(s): Abdominal pain (finding)         Physical Exam     Physical Exam   Constitutional: He is oriented to person, place, and time  He appears well-developed and well-nourished  No distress  HENT:   Head: Normocephalic and atraumatic  Right Ear: External ear normal    Left Ear: External ear normal    Nose: Nose normal    Eyes: Conjunctivae are normal  Right eye exhibits no discharge  Left eye exhibits no discharge  Neck: Normal range of motion  No tracheal deviation present  Pulmonary/Chest: Effort normal  No respiratory distress  Musculoskeletal: He exhibits no edema or deformity  Ambulates with cane assistance   Neurological: He is alert and oriented to person, place, and time  Skin: No rash noted  He is not diaphoretic  No erythema  Psychiatric: He has a normal mood and affect   His behavior is normal          Vital Signs     Vitals:    07/27/20 0824   BP: 122/76   BP Location: Left arm   Patient Position: Sitting   Cuff Size: Standard   Pulse: 62   Temp: (!) 97 1 °F (36 2 °C)   Weight: 66 kg (145 lb 6 4 oz)   Height: 5' 8" (1 727 m)         Current Medications       Current Outpatient Medications:     baclofen 10 mg tablet, Take 10 mg by mouth Three times daily as needed, Disp: , Rfl:     Cholecalciferol 1 25 MG (42107 UT) capsule, 1,000 Units , Disp: , Rfl:     Diclofenac Epolamine 1 3 % PTCH, APPLY 2GM TO UPPER EXTREMITIES TOPICALLY FOUR TIMES A DAY AS NEEDED **USE DOSING CARD** (DO NOT EXCEED 16 GRAMS DAILY TO ANY AFFECTED JOINT OF THE LOWER EXTREMITIES  DO NOT EXCEED 8 GRAMS DAILY TO ANY AFFECTED JOINT OF THE UPPER EXTREMITIES  DO NOT EXCEED A TOTAL DOSE OF 32 GRAMS DAILY OVER ALL JOINTS)  FOR NECK AND SHOULDER PAIN, Disp: , Rfl:     DULoxetine (CYMBALTA) 20 mg capsule, Take 40 mg by mouth 3 (three) times a day , Disp: , Rfl:     Gabapentin 300 MG/6ML SOLN, TAKE TWO CAPSULES BY MOUTH EVERY MORNING AND TAKE ONE CAPSULE NOON AND TAKE TWO CAPSULES EVENING FOR NEUROPATHIC PAIN OR SEIZURES, Disp: , Rfl:     hydrocortisone 2 5 % ointment, INSERT THIN LAYER INTO RECTUM THREE TIMES A DAY AS NEEDED FOR HEMORRHOIDAL PAIN AND SWELLING, Disp: , Rfl:     tamsulosin (FLOMAX) 0 4 mg, TAKE ONE CAPSULE BY MOUTH DAILY FOR PROSTATE/NIGHTTIME URINATION (TAKE 30 MINUTES AFTER THE SAME MEAL DAILY), Disp: , Rfl:     sertraline (ZOLOFT) 100 mg tablet, TAKE ONE AND ONE-HALF TABLETS BY MOUTH EVERY DAY FOR MOOD, Disp: , Rfl:       Active Problems     Patient Active Problem List   Diagnosis    Kidney stones         Past Medical History     Past Medical History:   Diagnosis Date    Kidney stone     [facility]          Surgical History     Past Surgical History:   Procedure Laterality Date    FL RETROGRADE PYELOGRAM  2/11/2020    LITHOTRIPSY      [place] CYSTO/URETERO W/LITHOTRIPSY &INDWELL STENT INSRT Left 2/11/2020    Procedure: CYSTOSCOPY URETEROSCOPY  WITH LITHOTRIPSY HOLMIUM LASER, BASKET STONE REMOVAL, RETROGRADE PYELOGRAM AND INSERTION STENT URETERAL;  Surgeon: Ashley Garcia MD;  Location: [facility];  Service: Urology         Family History     History reviewed  No pertinent family history        Social History     Social History       Radiology

## 2021-03-01 ENCOUNTER — TELEPHONE (OUTPATIENT)
Dept: UROLOGY | Facility: CLINIC | Age: 51
End: 2021-03-01

## 2021-03-01 DIAGNOSIS — N20.0 KIDNEY STONES: Primary | ICD-10-CM

## 2021-03-01 NOTE — TELEPHONE ENCOUNTER
Patient of the [place] office, known to practice for kidney stones  He had CYSTOSCOPY URETEROSCOPY  WITH LITHOTRIPSY HOLMIUM LASER, BASKET STONE REMOVAL, RETROGRADE PYELOGRAM AND INSERTION STENT URETERAL (Left Bladder) on 2/11/20 with Dr Gilford Calix  He had a follow up visit on 7/27/20 and plan was for yearly f/u with KUB prior  Email and records received from [facility] telephone communication with patient on 2/26/21  Patient reports left flank pain, 5/10 over the past two days  He denied fever,chills, hematuria and dysuria  He reports to passing 2-3 small stones or grit with each void  Records to be scanned into chart  [facility] care in the community  reached out to clerical staff in regards to sooner follow up  Please review and advise if you would recommend sooner imaging, urine testing, appt or something else

## 2021-03-01 NOTE — TELEPHONE ENCOUNTER
Left message for patient to return call  When patient returns call, can be advised of recommendation for CT   Office will monitor for results and contact patient once CT results received

## 2021-03-02 NOTE — TELEPHONE ENCOUNTER
Patient called back and per Sharee's note gave information about scheduling CT and provided central scheduling number  No other questions

## 2021-03-04 NOTE — TELEPHONE ENCOUNTER
Yoel Farris called from [place] stating patient needs assistance with scheduling the Ct scan   Patient called her and he is very anxious and needs help  Please check with him to see where he can go for test   She is stating if test can be schedule soon due to him passing kidney stones and having pain        Any questions call her at [phone]

## 2021-03-11 ENCOUNTER — HOSPITAL ENCOUNTER (OUTPATIENT)
Dept: CT IMAGING | Facility: HOSPITAL | Age: 51
Discharge: HOME | End: 2021-03-11
Payer: COMMERCIAL

## 2021-03-11 DIAGNOSIS — N20.0 KIDNEY STONES: ICD-10-CM

## 2021-03-11 PROCEDURE — 74176 CT ABD & PELVIS W/O CONTRAST: CPT

## 2021-03-11 PROCEDURE — G1004 CDSM NDSC: HCPCS

## 2021-03-17 NOTE — TELEPHONE ENCOUNTER
Attempted to call the patient  Left voicemail asking patient to call the office a call back to discuss CT results   Phone number left in message

## 2021-03-17 NOTE — TELEPHONE ENCOUNTER
CT results finalized  IMPRESSION:     Nonobstructing intrarenal calculi bilaterally, largest measuring 12 mm lower pole in the right kidney and largest on the left measuring up to 3 mm  A previously noted 7 mm interpolar left renal calculus is no longer evident  Routing to provider to review and advise

## 2021-03-17 NOTE — TELEPHONE ENCOUNTER
CT results reviewed, no obstruction  His symptoms probably related to previously seen left sided stone that was the gravel he passed  Everything else looks OK   Would keep f/u as planned

## 2021-03-18 NOTE — TELEPHONE ENCOUNTER
A second attempt was made requesting patient to give our office a call back  Our call back number was left in the message

## 2021-03-19 NOTE — TELEPHONE ENCOUNTER
Called and reviewed CT results with patient  Patient states if he has no kidney stones then why is he still having this pain  Patient stated that the Pain is in left kidney  Patient denies gross hematuria, dysuria, frequency and urgency  Patient states that he is hydrating with water  Please advise on plan so patient can be called  Thanks!

## 2021-03-19 NOTE — TELEPHONE ENCOUNTER
Called and spoke with patient at length  Reviewed CT results from 1/2020 compared with recent CT results  Patient is concerned because he feels he has passed a large stone in the past after a CT at [facility] that was not demonstrated on CT scan  Therefore he feels that he may get stones that do not show up on imaging  I reviewed with the patient that given his history of bladder stones the stone he passed may have been in the bladder not in the kidney at that time in the past but otherwise cannot speak to imaging that I do not have access to  Furthermore if a stone is causing obstruction or pain we generally can see other signs such as hydronephrosis  This is not evident now either  Encouraged heating pad and NSAIDS per advanced practitioner and FU with pcp if pain continues as it could be from a non urological cause

## 2021-07-21 ENCOUNTER — TELEPHONE (OUTPATIENT)
Dept: UROLOGY | Facility: CLINIC | Age: 51
End: 2021-07-21

## 2021-07-26 ENCOUNTER — OFFICE VISIT (OUTPATIENT)
Dept: UROLOGY | Facility: CLINIC | Age: 51
End: 2021-07-26

## 2021-07-26 VITALS
DIASTOLIC BLOOD PRESSURE: 62 MMHG | HEART RATE: 117 BPM | SYSTOLIC BLOOD PRESSURE: 128 MMHG | HEIGHT: 68 IN | BODY MASS INDEX: 20.16 KG/M2 | WEIGHT: 133 LBS

## 2021-07-26 DIAGNOSIS — N20.0 KIDNEY STONES: Primary | ICD-10-CM

## 2021-07-26 LAB — POST-VOID RESIDUAL VOLUME, ML POC: 177 ML

## 2021-07-26 NOTE — PROGRESS NOTES
1  Kidney stones           Assessment and plan:       1  Nephrolithiasis, calcium oxalate  -status post left ureteroscopy 02/11/2020 with Dr Marko Calixto  - I reviewed with the patient his most recent CT results  We discussed options for his large right lower pole stone including observation versus ESWL  Patient is not interested in intervention for his right stone at this time  -   We discussed metabolic workup in nephrology referral for further evaluation for his recurrent stones  Patient ultimately wishes to hold off on any further evaluation and surveillance at this time and declines labs and referral  -  We reviewed proper hydration dietary modifications in order to minimize future stone formation  - f/u with urology PRN  Encouraged to contact us in the future with concerns       Shirley Arias PA-C      Chief Complaint     Nephrolithiasis follow-up    History of Present Illness     Johnny Lopez is a 54 y o  male patient with a history of nephrolithiasis status post left ureteroscopy (2/11/2020) presenting for follow-up  Patient had been seen in consultation in January 2020 in regards to bilateral intrarenal calculi  Patient underwent cystoscopy, left ureteroscopy, holmium laser, basket extraction, retrograde pyelogram, left ureteral stent insertion (02/11/2020) with Dr Marko Calixto  Patient's stone analysis was 100% calcium oxalate  Patient had a follow-up KUB and ultrasound (07/07/2020) that confirmed an 11-12 mm right intrarenal calculus  Patient had been counseled on surgical intervention for his right intrarenal stone however wish for observation  He contacted our office in March of 2021 secondary to flank pain  A CT stone study obtained 03/11/2021 revealed stable 12 mm right lower pole stone,  And largest 3 mm on the left  Previous 7 mm left intrarenal calculus was no longer evident  There is no obstructive uropathy at that time            Patient unable to provide urine specimen in the office today  bladder scan of 177 mL      Review of Systems     Review of Systems   Constitutional: Negative for activity change, appetite change, chills, diaphoresis, fatigue, fever and unexpected weight change  Respiratory: Negative for chest tightness and shortness of breath  Cardiovascular: Negative for chest pain, palpitations and leg swelling  Gastrointestinal: Negative for abdominal distention, abdominal pain, constipation, diarrhea, nausea and vomiting  Genitourinary: Positive for flank pain  Negative for decreased urine volume, difficulty urinating, dysuria, enuresis, frequency, genital sores, hematuria and urgency  Musculoskeletal: Positive for arthralgias, back pain and myalgias  Negative for gait problem  Skin: Negative for color change, pallor, rash and wound  Psychiatric/Behavioral: Negative for behavioral problems  The patient is not nervous/anxious  Allergies     Allergies   Allergen Reactions   • Aspirin      Other reaction(s): Swelling of structure of eye (finding)   • Teriflunomide Abdominal Pain     Other reaction(s): Abdominal pain (finding)         Physical Exam     Physical Exam  Constitutional:       General: He is not in acute distress  Appearance: Normal appearance  He is well-developed  He is not ill-appearing, toxic-appearing or diaphoretic  HENT:      Head: Normocephalic and atraumatic  Right Ear: External ear normal       Left Ear: External ear normal       Nose: Nose normal    Eyes:      General:         Right eye: No discharge  Left eye: No discharge  Conjunctiva/sclera: Conjunctivae normal    Neck:      Trachea: No tracheal deviation  Pulmonary:      Effort: Pulmonary effort is normal  No respiratory distress  Musculoskeletal:         General: No deformity  Cervical back: Normal range of motion  Comments: Ambulates with cane assistance   Skin:     Findings: No erythema or rash     Neurological:      Mental Status: He is alert and oriented to person, place, and time  Coordination: Coordination abnormal    Psychiatric:         Mood and Affect: Mood normal          Behavior: Behavior normal            Vital Signs     Vitals:    07/26/21 0855   BP: 128/62   BP Location: Left arm   Patient Position: Sitting   Cuff Size: Adult   Pulse: (!) 117   Weight: 60 3 kg (133 lb)   Height: 5' 8" (1 727 m)         Current Medications       Current Outpatient Medications:   •  baclofen 10 mg tablet, Take 10 mg by mouth Three times daily as needed, Disp: , Rfl:   •  Cholecalciferol 1 25 MG (94060 UT) capsule, 1,000 Units , Disp: , Rfl:   •  Diclofenac Epolamine 1 3 % PTCH, APPLY 2GM TO UPPER EXTREMITIES TOPICALLY FOUR TIMES A DAY AS NEEDED **USE DOSING CARD** (DO NOT EXCEED 16 GRAMS DAILY TO ANY AFFECTED JOINT OF THE LOWER EXTREMITIES  DO NOT EXCEED 8 GRAMS DAILY TO ANY AFFECTED JOINT OF THE UPPER EXTREMITIES   DO NOT EXCEED A TOTAL DOSE OF 32 GRAMS DAILY OVER ALL JOINTS)  FOR NECK AND SHOULDER PAIN, Disp: , Rfl:   •  DULoxetine (CYMBALTA) 20 mg capsule, Take 40 mg by mouth 3 (three) times a day , Disp: , Rfl:   •  Gabapentin 300 MG/6ML SOLN, TAKE TWO CAPSULES BY MOUTH EVERY MORNING AND TAKE ONE CAPSULE NOON AND TAKE TWO CAPSULES EVENING FOR NEUROPATHIC PAIN OR SEIZURES, Disp: , Rfl:   •  hydrocortisone 2 5 % ointment, INSERT THIN LAYER INTO RECTUM THREE TIMES A DAY AS NEEDED FOR HEMORRHOIDAL PAIN AND SWELLING, Disp: , Rfl:   •  sertraline (ZOLOFT) 100 mg tablet, TAKE ONE AND ONE-HALF TABLETS BY MOUTH EVERY DAY FOR MOOD, Disp: , Rfl:   •  tamsulosin (FLOMAX) 0 4 mg, TAKE ONE CAPSULE BY MOUTH DAILY FOR PROSTATE/NIGHTTIME URINATION (TAKE 30 MINUTES AFTER THE SAME MEAL DAILY), Disp: , Rfl:       Active Problems     Patient Active Problem List   Diagnosis   • Kidney stones         Past Medical History     Past Medical History:   Diagnosis Date   • Kidney stone    • MS (multiple sclerosis) [facility])          Surgical History     Past Surgical History:   Procedure Laterality Date   • [place] RETROGRADE PYELOGRAM  2/11/2020   • LITHOTRIPSY     • [place] CYSTO/URETERO W/LITHOTRIPSY &INDWELL STENT INSRT Left 2/11/2020    Procedure: CYSTOSCOPY URETEROSCOPY  WITH LITHOTRIPSY HOLMIUM LASER, BASKET STONE REMOVAL, RETROGRADE PYELOGRAM AND INSERTION STENT URETERAL;  Surgeon: Annette Beckham MD;  Location: [facility];  Service: Urology         Family History     History reviewed  No pertinent family history        Social History     Social History       Radiology

## 2024-02-05 ENCOUNTER — HOME HEALTH ADMISSION (OUTPATIENT)
Dept: HOME HEALTH SERVICES | Facility: HOME HEALTHCARE | Age: 54
End: 2024-02-05
Payer: COMMERCIAL

## 2024-02-06 ENCOUNTER — HOME CARE VISIT (OUTPATIENT)
Dept: HOME HEALTH SERVICES | Facility: HOME HEALTHCARE | Age: 54
End: 2024-02-06

## 2024-02-07 ENCOUNTER — HOME CARE VISIT (OUTPATIENT)
Dept: HOME HEALTH SERVICES | Facility: HOME HEALTHCARE | Age: 54
End: 2024-02-07
Payer: COMMERCIAL

## 2024-02-07 VITALS
SYSTOLIC BLOOD PRESSURE: 110 MMHG | OXYGEN SATURATION: 94 % | HEIGHT: 68 IN | HEART RATE: 74 BPM | BODY MASS INDEX: 24.25 KG/M2 | WEIGHT: 160 LBS | TEMPERATURE: 96.9 F | DIASTOLIC BLOOD PRESSURE: 78 MMHG

## 2024-02-07 PROCEDURE — G0151 HHCP-SERV OF PT,EA 15 MIN: HCPCS

## 2024-02-07 PROCEDURE — 400013 VN SOC

## 2024-02-09 ENCOUNTER — HOME CARE VISIT (OUTPATIENT)
Dept: HOME HEALTH SERVICES | Facility: HOME HEALTHCARE | Age: 54
End: 2024-02-09
Payer: COMMERCIAL

## 2024-02-09 PROCEDURE — G0321 AUDIO-ONLY HHS: HCPCS

## 2024-02-12 ENCOUNTER — HOME CARE VISIT (OUTPATIENT)
Dept: HOME HEALTH SERVICES | Facility: HOME HEALTHCARE | Age: 54
End: 2024-02-12
Payer: COMMERCIAL

## 2024-02-12 VITALS — OXYGEN SATURATION: 95 % | DIASTOLIC BLOOD PRESSURE: 70 MMHG | SYSTOLIC BLOOD PRESSURE: 120 MMHG | HEART RATE: 68 BPM

## 2024-02-12 PROCEDURE — G0151 HHCP-SERV OF PT,EA 15 MIN: HCPCS

## 2024-02-14 ENCOUNTER — HOME CARE VISIT (OUTPATIENT)
Dept: HOME HEALTH SERVICES | Facility: HOME HEALTHCARE | Age: 54
End: 2024-02-14
Payer: COMMERCIAL

## 2024-02-14 VITALS — DIASTOLIC BLOOD PRESSURE: 68 MMHG | HEART RATE: 80 BPM | SYSTOLIC BLOOD PRESSURE: 118 MMHG | OXYGEN SATURATION: 94 %

## 2024-02-14 PROCEDURE — G0151 HHCP-SERV OF PT,EA 15 MIN: HCPCS

## 2024-02-20 ENCOUNTER — HOME CARE VISIT (OUTPATIENT)
Dept: HOME HEALTH SERVICES | Facility: HOME HEALTHCARE | Age: 54
End: 2024-02-20
Payer: COMMERCIAL

## 2024-02-20 VITALS — HEART RATE: 72 BPM | OXYGEN SATURATION: 93 % | SYSTOLIC BLOOD PRESSURE: 112 MMHG | DIASTOLIC BLOOD PRESSURE: 64 MMHG

## 2024-02-20 PROCEDURE — G0151 HHCP-SERV OF PT,EA 15 MIN: HCPCS

## 2024-02-22 ENCOUNTER — HOME CARE VISIT (OUTPATIENT)
Dept: HOME HEALTH SERVICES | Facility: HOME HEALTHCARE | Age: 54
End: 2024-02-22
Payer: COMMERCIAL

## 2024-02-22 VITALS — SYSTOLIC BLOOD PRESSURE: 120 MMHG | OXYGEN SATURATION: 95 % | DIASTOLIC BLOOD PRESSURE: 76 MMHG | HEART RATE: 91 BPM

## 2024-02-22 PROCEDURE — G0151 HHCP-SERV OF PT,EA 15 MIN: HCPCS

## 2024-02-26 ENCOUNTER — HOME CARE VISIT (OUTPATIENT)
Dept: HOME HEALTH SERVICES | Facility: HOME HEALTHCARE | Age: 54
End: 2024-02-26
Payer: COMMERCIAL

## 2024-02-26 VITALS — HEART RATE: 85 BPM | OXYGEN SATURATION: 93 % | SYSTOLIC BLOOD PRESSURE: 124 MMHG | DIASTOLIC BLOOD PRESSURE: 72 MMHG

## 2024-02-26 PROCEDURE — G0151 HHCP-SERV OF PT,EA 15 MIN: HCPCS

## 2024-02-29 ENCOUNTER — HOME CARE VISIT (OUTPATIENT)
Dept: HOME HEALTH SERVICES | Facility: HOME HEALTHCARE | Age: 54
End: 2024-02-29
Payer: COMMERCIAL

## 2024-02-29 VITALS — SYSTOLIC BLOOD PRESSURE: 118 MMHG | DIASTOLIC BLOOD PRESSURE: 64 MMHG | OXYGEN SATURATION: 99 % | HEART RATE: 73 BPM

## 2024-02-29 PROCEDURE — G0151 HHCP-SERV OF PT,EA 15 MIN: HCPCS

## 2024-03-04 ENCOUNTER — HOME CARE VISIT (OUTPATIENT)
Dept: HOME HEALTH SERVICES | Facility: HOME HEALTHCARE | Age: 54
End: 2024-03-04
Payer: COMMERCIAL

## 2024-03-04 VITALS — HEART RATE: 82 BPM | DIASTOLIC BLOOD PRESSURE: 64 MMHG | OXYGEN SATURATION: 97 % | SYSTOLIC BLOOD PRESSURE: 114 MMHG

## 2024-03-04 PROCEDURE — G0151 HHCP-SERV OF PT,EA 15 MIN: HCPCS

## 2024-03-07 ENCOUNTER — HOME CARE VISIT (OUTPATIENT)
Dept: HOME HEALTH SERVICES | Facility: HOME HEALTHCARE | Age: 54
End: 2024-03-07
Payer: COMMERCIAL

## 2024-03-07 PROCEDURE — G0151 HHCP-SERV OF PT,EA 15 MIN: HCPCS

## 2024-03-08 VITALS — DIASTOLIC BLOOD PRESSURE: 70 MMHG | HEART RATE: 94 BPM | SYSTOLIC BLOOD PRESSURE: 110 MMHG | OXYGEN SATURATION: 95 %

## (undated) DEVICE — 3M™ TEGADERM™ TRANSPARENT FILM DRESSING FRAME STYLE, 1626W, 4 IN X 4-3/4 IN (10 CM X 12 CM), 50/CT 4CT/CASE: Brand: 3M™ TEGADERM™

## (undated) DEVICE — GLOVE SRG BIOGEL 7.5

## (undated) DEVICE — CATH FOLEY 16FR 5ML 2 WAY UNCOATED SILICONE

## (undated) DEVICE — LASER FIBER HOLMIUM 272MICRON

## (undated) DEVICE — LUBRICANT SURGILUBE TUBE 4 OZ  FLIP TOP

## (undated) DEVICE — CATH URETERAL 5FR X 70 CM FLEX TIP POLYUR BARD

## (undated) DEVICE — UROCATCH BAG

## (undated) DEVICE — TUBING SUCTION 5MM X 12 FT

## (undated) DEVICE — PACK TUR

## (undated) DEVICE — CATH URET .038 10FR 50CM DUAL LUMEN

## (undated) DEVICE — INVIEW CLEAR LEGGINGS: Brand: CONVERTORS

## (undated) DEVICE — GUIDEWIRE STRGHT TIP 0.035 IN  SOLO PLUS

## (undated) DEVICE — BASKET SPECIMEN RETRIVAL 1.9FR 120CM